# Patient Record
Sex: FEMALE
[De-identification: names, ages, dates, MRNs, and addresses within clinical notes are randomized per-mention and may not be internally consistent; named-entity substitution may affect disease eponyms.]

---

## 2023-06-19 PROBLEM — Z00.00 ENCOUNTER FOR PREVENTIVE HEALTH EXAMINATION: Status: ACTIVE | Noted: 2023-06-19

## 2023-09-07 ENCOUNTER — APPOINTMENT (OUTPATIENT)
Dept: OTOLARYNGOLOGY | Facility: CLINIC | Age: 38
End: 2023-09-07

## 2023-10-10 ENCOUNTER — APPOINTMENT (OUTPATIENT)
Dept: OTOLARYNGOLOGY | Facility: CLINIC | Age: 38
End: 2023-10-10

## 2024-01-29 ENCOUNTER — OUTPATIENT (OUTPATIENT)
Dept: OUTPATIENT SERVICES | Facility: HOSPITAL | Age: 39
LOS: 1 days | Discharge: HOME | End: 2024-01-29

## 2024-01-29 ENCOUNTER — APPOINTMENT (OUTPATIENT)
Dept: OTOLARYNGOLOGY | Facility: CLINIC | Age: 39
End: 2024-01-29
Payer: COMMERCIAL

## 2024-01-29 VITALS — DIASTOLIC BLOOD PRESSURE: 76 MMHG | SYSTOLIC BLOOD PRESSURE: 111 MMHG

## 2024-01-29 DIAGNOSIS — Z82.5 FAMILY HISTORY OF ASTHMA AND OTHER CHRONIC LOWER RESPIRATORY DISEASES: ICD-10-CM

## 2024-01-29 DIAGNOSIS — H61.23 IMPACTED CERUMEN, BILATERAL: ICD-10-CM

## 2024-01-29 DIAGNOSIS — Z78.9 OTHER SPECIFIED HEALTH STATUS: ICD-10-CM

## 2024-01-29 DIAGNOSIS — K14.8 OTHER DISEASES OF TONGUE: ICD-10-CM

## 2024-01-29 DIAGNOSIS — R09.82 POSTNASAL DRIP: ICD-10-CM

## 2024-01-29 DIAGNOSIS — J39.2 OTHER DISEASES OF PHARYNX: ICD-10-CM

## 2024-01-29 DIAGNOSIS — K21.9 GASTRO-ESOPHAGEAL REFLUX DISEASE W/OUT ESOPHAGITIS: ICD-10-CM

## 2024-01-29 DIAGNOSIS — Z83.3 FAMILY HISTORY OF DIABETES MELLITUS: ICD-10-CM

## 2024-01-29 PROCEDURE — 69210 REMOVE IMPACTED EAR WAX UNI: CPT

## 2024-01-29 PROCEDURE — 99204 OFFICE O/P NEW MOD 45 MIN: CPT | Mod: 25

## 2024-01-29 PROCEDURE — 31575 DIAGNOSTIC LARYNGOSCOPY: CPT

## 2024-01-29 RX ORDER — FAMOTIDINE 20 MG/1
20 TABLET, FILM COATED ORAL
Qty: 90 | Refills: 1 | Status: ACTIVE | COMMUNITY
Start: 2024-01-29 | End: 1900-01-01

## 2024-01-29 RX ORDER — SOD CHLOR,BICARB/SQUEEZ BOTTLE
PACKET, WITH RINSE DEVICE NASAL
Qty: 1 | Refills: 3 | Status: ACTIVE | COMMUNITY
Start: 2024-01-29 | End: 1900-01-01

## 2024-01-30 ENCOUNTER — RESULT REVIEW (OUTPATIENT)
Age: 39
End: 2024-01-30

## 2024-02-01 ENCOUNTER — OUTPATIENT (OUTPATIENT)
Dept: OUTPATIENT SERVICES | Facility: HOSPITAL | Age: 39
LOS: 1 days | End: 2024-02-01
Payer: SELF-PAY

## 2024-02-01 ENCOUNTER — APPOINTMENT (OUTPATIENT)
Dept: CT IMAGING | Facility: CLINIC | Age: 39
End: 2024-02-01
Payer: COMMERCIAL

## 2024-02-01 DIAGNOSIS — K14.8 OTHER DISEASES OF TONGUE: ICD-10-CM

## 2024-02-01 PROCEDURE — 70491 CT SOFT TISSUE NECK W/DYE: CPT | Mod: 26

## 2024-02-01 PROCEDURE — 70491 CT SOFT TISSUE NECK W/DYE: CPT

## 2024-02-06 PROBLEM — J39.2 THROAT IRRITATION: Status: ACTIVE | Noted: 2024-02-06

## 2024-02-06 PROBLEM — H61.23 BILATERAL IMPACTED CERUMEN: Status: ACTIVE | Noted: 2024-02-06

## 2024-02-07 DIAGNOSIS — K14.9 DISEASE OF TONGUE, UNSPECIFIED: ICD-10-CM

## 2024-02-07 DIAGNOSIS — H61.23 IMPACTED CERUMEN, BILATERAL: ICD-10-CM

## 2024-02-07 DIAGNOSIS — R09.82 POSTNASAL DRIP: ICD-10-CM

## 2024-02-07 DIAGNOSIS — J39.2 OTHER DISEASES OF PHARYNX: ICD-10-CM

## 2024-02-07 NOTE — REASON FOR VISIT
[[employer] ] : provided by [employer]   [Initial Evaluation] : an initial evaluation for [Family Member] : family member [FreeTextEntry2] : hearing loss, dysphagia  [Interpreters_IDNumber] : [id number] [Interpreters_FullName] : Behzad [TWNoteComboBox1] : Prydeinig

## 2024-02-07 NOTE — HISTORY OF PRESENT ILLNESS
[de-identified] : 38 year old female presents for evaluation of throat pain, left ear intermittent otalgia for 1 year Previously followed up with Dr. Mat Ibarra ENTA Last b/l ear infection about 5 months ago  Denies otorrhea, dizziness, imbalance.  She was instructed to get MRI by Dr. Almodovar, but did not obtain. No other scans done.  She reports intermittent throat pain for about 17 years.  Has had vocal hoarseness for over 12 years - gradually worsening  She reports going to urgent cares in past for pain, but does not remember medications prescribed.  Dr. Almodovar prescribed PPI, but not actively taking - referred for speech therapy, no sessions done She normally tolerates regular diet, denies aspiration episodes. She reports occasional heartburn symptoms - no medication intervention at this time Drinks warm liquids for some relief.  Denies unintentional weight loss.

## 2024-02-07 NOTE — ASSESSMENT
[FreeTextEntry1] : 38 year F present with bilateral cerumen impaction, Throat Irrigation 2/2 combination of PND and LPR . Patient tolerated cerumen removal without complaints.   Indirect Laryngoscopy shows moderate postcricoid edema, mild arytenoids edema, moderate mucus production coating nasal cavity, bilateral vocal fold symmetrical mobile, prominent base of tongue. Lingual hypertrophy at base of tongue  Physical exam shows bilateral  ears normal EAC/TM.   Recommend Cerumen Impaction -Discussed not using q-tips or instruments to remove wax -Discussed that the ear is a self cleaning structure and just allow it clean itself. If wax builds up can try debrox. Once it gets impacted recommend return to get it cleaned out.  Laryngopharyngeal Reflux - Discussed Lifestyle changes as the most effective methods to improvement LPR. Weight Loss if overweight. Avoid foods that increase the level of acid in your stomach, including caffeinated/carbonated drinks.  - Avoid foods that decrease the pressure in the lower esophagus, such as fatty foods, alcohol and peppermint. - Avoid large meals.Try to have an empty stomach 2 hours before going to bed. If still smoking please Quit. - Head of bed elevation when you lying down -LPR Handout Given -Discussed Sodium alginate as mechanical plug for preventing reflux - Discussed with Patient if they have not seen Gastroenterolgy in the past for endoscopy they should follow up as chronic reflux can causes mucosal changes in the lining of the lower esophagus  - Send to Pharmacy Famotidine 20mg at bedtime  PND -Discussed Post-nasal drip occurs when mucus from the nasal passages and sinuses drains into the throat. If the mucus becomes thick, post-nasal drip can cause problems such as a sore throat, laryngitis, a cough, bad breath, hoarseness, and sometimes wheezing. -Discussed the importance of rinsing the sinus before using nasal spray so that excess mucus lining the nasal cavity can be wash away so medication can penetration the nose. -Send to Pharmacy Flonase nasal spray -If Flonase spray is not effective can discuss additional other nasal sprays for treatment  Lingual Hypertrophy -Ordered CT Neck with contrast to evaluate for any discrete base of tongue lesion  -Return to clinic in 1 months or sooner if new/worsen symptoms present

## 2024-03-04 ENCOUNTER — APPOINTMENT (OUTPATIENT)
Dept: OTOLARYNGOLOGY | Facility: CLINIC | Age: 39
End: 2024-03-04

## 2024-05-08 ENCOUNTER — RX RENEWAL (OUTPATIENT)
Age: 39
End: 2024-05-08

## 2024-05-08 RX ORDER — FLUTICASONE PROPIONATE 50 UG/1
50 SPRAY, METERED NASAL DAILY
Qty: 16 | Refills: 2 | Status: ACTIVE | COMMUNITY
Start: 2024-01-29 | End: 1900-01-01